# Patient Record
Sex: FEMALE | Race: WHITE
[De-identification: names, ages, dates, MRNs, and addresses within clinical notes are randomized per-mention and may not be internally consistent; named-entity substitution may affect disease eponyms.]

---

## 2019-11-24 ENCOUNTER — HOSPITAL ENCOUNTER (EMERGENCY)
Dept: HOSPITAL 25 - ED | Age: 19
Discharge: HOME | End: 2019-11-24
Payer: COMMERCIAL

## 2019-11-24 VITALS — DIASTOLIC BLOOD PRESSURE: 66 MMHG | SYSTOLIC BLOOD PRESSURE: 107 MMHG

## 2019-11-24 DIAGNOSIS — N93.8: Primary | ICD-10-CM

## 2019-11-24 DIAGNOSIS — Z79.899: ICD-10-CM

## 2019-11-24 LAB
ALBUMIN SERPL BCG-MCNC: 4.1 G/DL (ref 3.2–5.2)
ALBUMIN/GLOB SERPL: 1.2 {RATIO} (ref 1–3)
ALP SERPL-CCNC: 44 U/L (ref 34–104)
ALT SERPL W P-5'-P-CCNC: 11 U/L (ref 7–52)
ANION GAP SERPL CALC-SCNC: 8 MMOL/L (ref 2–11)
AST SERPL-CCNC: 15 U/L (ref 13–39)
BASOPHILS # BLD AUTO: 0 10^3/UL (ref 0–0.2)
BUN SERPL-MCNC: 9 MG/DL (ref 6–24)
BUN/CREAT SERPL: 12.9 (ref 8–20)
CALCIUM SERPL-MCNC: 9.5 MG/DL (ref 8.6–10.3)
CHLORIDE SERPL-SCNC: 105 MMOL/L (ref 101–111)
EOSINOPHIL # BLD AUTO: 0.2 10^3/UL (ref 0–0.6)
GLOBULIN SER CALC-MCNC: 3.4 G/DL (ref 2–4)
GLUCOSE SERPL-MCNC: 96 MG/DL (ref 70–100)
HCG SERPL QL: < 0.6 MIU/ML
HCO3 SERPL-SCNC: 23 MMOL/L (ref 22–32)
HCT VFR BLD AUTO: 40 % (ref 35–47)
HGB BLD-MCNC: 13.4 G/DL (ref 12–16)
LYMPHOCYTES # BLD AUTO: 3 10^3/UL (ref 1–4.8)
MCH RBC QN AUTO: 29 PG (ref 27–31)
MCHC RBC AUTO-ENTMCNC: 34 G/DL (ref 31–36)
MCV RBC AUTO: 85 FL (ref 80–97)
MONOCYTES # BLD AUTO: 1 10^3/UL (ref 0–0.8)
NEUTROPHILS # BLD AUTO: 7 10^3/UL (ref 1.5–7.7)
NRBC # BLD AUTO: 0 10^3/UL
NRBC BLD QL AUTO: 0
PLATELET # BLD AUTO: 286 10^3/UL (ref 150–450)
POTASSIUM SERPL-SCNC: 3.6 MMOL/L (ref 3.5–5)
PROT SERPL-MCNC: 7.5 G/DL (ref 6.4–8.9)
RBC # BLD AUTO: 4.67 10^6 /UL (ref 3.7–4.87)
RBC UR QL AUTO: (no result)
SODIUM SERPL-SCNC: 136 MMOL/L (ref 135–145)
WBC # BLD AUTO: 11.3 10^3/UL (ref 3.5–10.8)
WBC UR QL AUTO: (no result)

## 2019-11-24 PROCEDURE — 84702 CHORIONIC GONADOTROPIN TEST: CPT

## 2019-11-24 PROCEDURE — 36415 COLL VENOUS BLD VENIPUNCTURE: CPT

## 2019-11-24 PROCEDURE — 81015 MICROSCOPIC EXAM OF URINE: CPT

## 2019-11-24 PROCEDURE — 85025 COMPLETE CBC W/AUTO DIFF WBC: CPT

## 2019-11-24 PROCEDURE — 76856 US EXAM PELVIC COMPLETE: CPT

## 2019-11-24 PROCEDURE — 81003 URINALYSIS AUTO W/O SCOPE: CPT

## 2019-11-24 PROCEDURE — 87086 URINE CULTURE/COLONY COUNT: CPT

## 2019-11-24 PROCEDURE — 80053 COMPREHEN METABOLIC PANEL: CPT

## 2019-11-24 PROCEDURE — 99282 EMERGENCY DEPT VISIT SF MDM: CPT

## 2019-11-24 PROCEDURE — 86140 C-REACTIVE PROTEIN: CPT

## 2019-11-24 NOTE — ED
Abdominal Pain/Female





- HPI Summary


HPI Summary: 





Patient complains of lower abdominal pain starting this morning at 10 AM, 

described as constant with spikes, radiating to lower back.  Currently rated 5/

10.  Patient also complains of vaginal bleeding with use of 4 tampons since 

this morning.  LMP 3 weeks ago, states he has usually bleeds minimally with her 

periods.  Positive OCP.  Negative pregnancy test and negative urine at urgent 

care, was sent to the ED for further evaluation.  Associated symptoms are mild 

nausea and some loose stool.  Denies fever, cough, sore throat, CP, SOB, 

vomiting, change in urine, vaginal discharge or pain.  Medical history is none.

  Abdominal surgical history is none.  Tylenol taken at 10:30 AM with no relief 

of symptoms.





- History of Current Complaint


Chief Complaint: EDAbdPain


Stated Complaint: ABD PAIN/CRAMPING PER PT


Time Seen by Provider: 11/24/19 15:33


Hx Obtained From: Patient


Onset/Duration: Sudden Onset, Lasting Hours


Timing: Constant


Severity Initially: Moderate


Severity Currently: Moderate


Pain Intensity: 6


Pain Scale Used: 0-10 Numeric


Location: Suprapubic


Radiates: Yes


Radiates to: Back


Character: Sharp, Cramping


Aggravating Factor(s): Nothing


Alleviating Factor(s): Nothing


Associated Signs and Symptoms: Positive: Negative


Allergies/Adverse Reactions: 


 Allergies











Allergy/AdvReac Type Severity Reaction Status Date / Time


 


legumes Allergy  Anaphylatic Verified 11/24/19 15:24





   Shock  


 


nut - unspecified Allergy  Anaphylatic Verified 11/24/19 15:24





   Shock  


 


sesame seed Allergy  Hives Verified 11/24/19 15:24


 


shellfish derived Allergy  Hives Verified 11/24/19 15:24











Home Medications: 


 Home Medications





Norethindrone-E.estradiol-Iron [Microgestin Fe 1-20 mg-Mcg] 1 tab PO DAILY 11/24 /19 [History Confirmed 11/24/19]


busPIRone TAB* [Buspar TAB *] 15 mg PO DAILY 11/24/19 [History Confirmed 11/24/ 19]











PMH/Surg Hx/FS Hx/Imm Hx


Endocrine/Hematology History: 


   Denies: Hx Anticoagulant Therapy


Cardiovascular History: 


   Denies: Hx Pacemaker/ICD


 History: 


   Denies: Hx Dialysis


Sensory History: 


   Denies: Hx Eye Prosthesis


Opthamlomology History: 


   Denies: Hx Legally Blind


EENT History: 


   Denies: Hx Deafness


Neurological History: 


   Denies: Hx Dementia


Infectious Disease History: No


Infectious Disease History: 


   Denies: Traveled Outside the [place] in Last 30 Days





- Family History


Known Family History: Positive: Non-Contributory





- Social History


Alcohol Use: Occasionally


Hx Substance Use: No


Hx Tobacco Use: No





Review of Systems


Constitutional: Negative


Eyes: Negative


ENT: Negative


Cardiovascular: Negative


Respiratory: Negative


Positive: Abdominal Pain, Nausea


Genitourinary: Negative


Musculoskeletal: Negative


Skin: Negative


Neurological: Negative


Psychological: Normal


All Other Systems Reviewed And Are Negative: Yes





Physical Exam





- Summary


Physical Exam Summary: 





Abdomen soft nontender.


Triage Information Reviewed: Yes


Vital Signs On Initial Exam: 


 Initial Vitals











Temp Pulse Resp BP Pulse Ox


 


 98.2 F   80   16   133/64   100 


 


 11/24/19 15:21  11/24/19 15:21  11/24/19 15:21  11/24/19 15:21  11/24/19 15:21











Vital Signs Reviewed: Yes


Appearance: Positive: Well-Appearing


Skin: Positive: Warm


Head/Face: Positive: Normal Head/Face Inspection


Eyes: Positive: Normal


Neck: Positive: Supple


Respiratory/Lung Sounds: Positive: Clear to Auscultation


Cardiovascular: Positive: Normal


Abdomen Description: Positive: Nontender


Musculoskeletal: Positive: Normal


Neurological: Positive: Normal


Psychiatric: Positive: Normal


AVPU Assessment: Alert





- [place] Coma Scale


Best Eye Response: 4 - Spontaneous


Best Motor Response: 6 - Obeys Commands


Best Verbal Response: 5 - Oriented


Coma Scale Total: 15





Procedures





- Sedation


Patient Received Moderate/Deep Sedation with Procedure: No





Diagnostics





- Vital Signs


 Vital Signs











  Temp Pulse Resp BP Pulse Ox


 


 11/24/19 15:21  98.2 F  80  16  133/64  100














- Laboratory


Result Diagrams: 


 11/24/19 16:14





 11/24/19 16:14


Lab Statement: Any lab studies that have been ordered have been reviewed, and 

results considered in the medical decision making process.





Abdominal Pain Fem Course/Dx





- Course


Course Of Treatment: Patient complains of lower abdominal pain starting this 

morning at 10 AM, described as constant with spikes, radiating to lower back.  

Currently rated 5/10.  Patient also complains of vaginal bleeding with use of 4 

tampons since this morning.  LMP 3 weeks ago, states he has usually bleeds 

minimally with her periods.  Positive OCP.  Negative pregnancy test and 

negative urine at urgent care, was sent to the ED for further evaluation.  

Associated symptoms are mild nausea and some loose stool.  Denies fever, cough, 

sore throat, CP, SOB, vomiting, change in urine, vaginal discharge or pain.  

Medical history is none.  Abdominal surgical history is none.  Tylenol taken at 

10:30 AM with no relief of symptoms.  Vital signs within normal limits.  WBC 

11.3.  Labs otherwise unremarkable.  Pelvic ultrasound negative.





- Diagnoses


Provider Diagnoses: 


 Dysfunctional uterine bleeding








Discharge ED





- Sign-Out/Discharge


Documenting (check all that apply): Patient Departure





- Discharge Plan


Condition: Stable


Disposition: HOME


Prescriptions: 


Ondansetron ODT TAB* [Zofran 4 MG Odt TAB*] 4 mg PO Q8H PRN 4 Days #14 tab.odt


 PRN Reason: Nausea


Oxycodone HCl 5 mg PO BID 2 Days #4 tablet MDD 2 tabs


Patient Education Materials:  Dysfunctional Uterine Bleeding (ED)


Referrals: 


[facility] - Dileep GILBERT [Primary Care Provider] - 


Additional Instructions: 


Alternate ibuprofen 400 mg with Tylenol 650 mg every 3 hours as needed for 

pain.  Take oxycodone for breakthrough pain.  Takes Zofran for nausea as 

directed if needed.  Follow-up with her OB/GYN for further evaluation of 

vaginal bleeding.  Return to the ED for any concerning symptoms including 

severe bleeding, worsening abdominal pain, fever. 





- Billing Disposition and Condition


Condition: STABLE


Disposition: Home





- Attestation Statements


Provider Attestation: 





I was available for consult. This patient was seen by the JACKLYN. The patient was 

not presented to, seen by, or examined by me. Florentino Maher MD